# Patient Record
Sex: FEMALE | Race: WHITE | NOT HISPANIC OR LATINO | Employment: FULL TIME | URBAN - NONMETROPOLITAN AREA
[De-identification: names, ages, dates, MRNs, and addresses within clinical notes are randomized per-mention and may not be internally consistent; named-entity substitution may affect disease eponyms.]

---

## 2017-12-21 ENCOUNTER — OTHER- (OUTPATIENT)
Dept: URBAN - NONMETROPOLITAN AREA CLINIC 3 | Facility: CLINIC | Age: 37
Setting detail: DERMATOLOGY
End: 2017-12-21

## 2017-12-21 DIAGNOSIS — L57.0 ACTINIC KERATOSIS: ICD-10-CM

## 2017-12-21 PROCEDURE — 99212 OFFICE O/P EST SF 10 MIN: CPT

## 2017-12-21 RX ORDER — CLOBETASOL PROPIONATE 0.5 MG/G
1 APPLICATION GEL TOPICAL BID
Qty: 60 | Refills: 1 | Status: DISCONTINUED
Start: 2017-12-21 | End: 2018-05-08

## 2018-05-08 ENCOUNTER — RX ONLY (RX ONLY)
Age: 38
End: 2018-05-08

## 2018-05-08 RX ORDER — CLOBETASOL PROPIONATE 0.5 MG/G
1 APPLICATION GEL TOPICAL BID
Qty: 60 | Refills: 1 | Status: DISCONTINUED
Start: 2018-05-08 | End: 2018-10-05

## 2018-10-05 ENCOUNTER — RX ONLY (RX ONLY)
Age: 38
End: 2018-10-05

## 2018-10-05 RX ORDER — CLOBETASOL PROPIONATE 0.5 MG/G
1 APPLICATION GEL TOPICAL BID
Qty: 60 | Refills: 1 | Status: DISCONTINUED
Start: 2018-10-05 | End: 2019-04-08

## 2019-04-08 ENCOUNTER — RX ONLY (RX ONLY)
Age: 39
End: 2019-04-08

## 2019-04-08 RX ORDER — CLOBETASOL PROPIONATE 0.5 MG/G
1 APPLICATION GEL TOPICAL BID
Qty: 60 | Refills: 0 | Status: DISCONTINUED
Start: 2019-04-08 | End: 2019-05-13

## 2019-05-13 ENCOUNTER — SKIN CHECK (OUTPATIENT)
Dept: URBAN - NONMETROPOLITAN AREA CLINIC 3 | Facility: CLINIC | Age: 39
Setting detail: DERMATOLOGY
End: 2019-05-13

## 2019-05-13 DIAGNOSIS — L28.1 PRURIGO NODULARIS: ICD-10-CM

## 2019-05-13 PROCEDURE — 99213 OFFICE O/P EST LOW 20 MIN: CPT

## 2019-05-13 RX ORDER — FLUOCINONIDE 0.5 MG/G
1 APPLICATION OINTMENT TOPICAL BID
Qty: 60 | Refills: 1 | Status: DISCONTINUED
Start: 2019-05-13 | End: 2020-03-03

## 2019-08-06 ENCOUNTER — RX ONLY (RX ONLY)
Age: 39
End: 2019-08-06

## 2019-08-06 RX ORDER — CLOBETASOL PROPIONATE 0.5 MG/G
1 APPLICATION OINTMENT TOPICAL BID
Qty: 60 | Refills: 1 | Status: DISCONTINUED
Start: 2019-08-06 | End: 2020-03-03

## 2020-03-03 ENCOUNTER — RX ONLY (RX ONLY)
Age: 40
End: 2020-03-03

## 2020-03-03 RX ORDER — CLOBETASOL PROPIONATE 0.5 MG/G
1 APPLICATION OINTMENT TOPICAL BID
Qty: 60 | Refills: 1
Start: 2020-03-03

## 2021-01-13 ENCOUNTER — FOLLOW-UP (OUTPATIENT)
Dept: URBAN - NONMETROPOLITAN AREA CLINIC 3 | Facility: CLINIC | Age: 41
Setting detail: DERMATOLOGY
End: 2021-01-13

## 2021-01-13 DIAGNOSIS — C44.519 BASAL CELL CARCINOMA OF SKIN OF OTHER PART OF TRUNK: ICD-10-CM

## 2021-01-13 PROCEDURE — 99212 OFFICE O/P EST SF 10 MIN: CPT

## 2021-01-13 RX ORDER — CLOBETASOL PROPIONATE 0.5 MG/G
A SMALL AMOUNT OINTMENT TOPICAL TWICE A DAY
Qty: 60 | Refills: 4
Start: 2021-01-13

## 2021-07-02 ENCOUNTER — OTHER- (OUTPATIENT)
Dept: URBAN - NONMETROPOLITAN AREA CLINIC 3 | Facility: CLINIC | Age: 41
Setting detail: DERMATOLOGY
End: 2021-07-02

## 2021-07-02 DIAGNOSIS — L57.0 ACTINIC KERATOSIS: ICD-10-CM

## 2021-07-02 PROCEDURE — 99213 OFFICE O/P EST LOW 20 MIN: CPT

## 2021-12-13 ENCOUNTER — FOLLOW-UP (OUTPATIENT)
Dept: URBAN - NONMETROPOLITAN AREA CLINIC 3 | Facility: CLINIC | Age: 41
Setting detail: DERMATOLOGY
End: 2021-12-13

## 2021-12-13 DIAGNOSIS — L57.0 ACTINIC KERATOSIS: ICD-10-CM

## 2021-12-13 PROCEDURE — 87220 TISSUE EXAM FOR FUNGI: CPT

## 2021-12-13 PROCEDURE — 99213 OFFICE O/P EST LOW 20 MIN: CPT

## 2022-05-10 ENCOUNTER — RX ONLY (RX ONLY)
Age: 42
End: 2022-05-10

## 2022-05-10 RX ORDER — CLOBETASOL PROPIONATE 0.5 MG/G
A SMALL AMOUNT OINTMENT TOPICAL TWICE A DAY
Qty: 60 | Refills: 1
Start: 2022-05-10

## 2023-01-11 ENCOUNTER — RX ONLY (RX ONLY)
Age: 43
End: 2023-01-11

## 2023-01-11 RX ORDER — CLOBETASOL PROPIONATE 0.5 MG/G
OINTMENT TOPICAL
Qty: 30 | Refills: 0 | Status: ERX | COMMUNITY
Start: 2023-01-11

## 2023-01-18 ENCOUNTER — APPOINTMENT (OUTPATIENT)
Dept: URBAN - NONMETROPOLITAN AREA CLINIC 49 | Age: 43
Setting detail: DERMATOLOGY
End: 2023-01-19

## 2023-01-18 DIAGNOSIS — L30.1 DYSHIDROSIS [POMPHOLYX]: ICD-10-CM

## 2023-01-18 PROCEDURE — OTHER ORDER TESTS: OTHER

## 2023-01-18 PROCEDURE — OTHER PRESCRIPTION: OTHER

## 2023-01-18 PROCEDURE — OTHER COUNSELING: OTHER

## 2023-01-18 PROCEDURE — 99214 OFFICE O/P EST MOD 30 MIN: CPT

## 2023-01-18 RX ORDER — CLOBETASOL PROPIONATE 0.5 MG/G
OINTMENT TOPICAL
Qty: 60 | Refills: 2 | Status: ERX

## 2023-01-18 ASSESSMENT — LOCATION ZONE DERM
LOCATION ZONE: FEET
LOCATION ZONE: HAND

## 2023-01-18 ASSESSMENT — LOCATION SIMPLE DESCRIPTION DERM
LOCATION SIMPLE: LEFT HAND
LOCATION SIMPLE: RIGHT PLANTAR SURFACE
LOCATION SIMPLE: LEFT PLANTAR SURFACE
LOCATION SIMPLE: RIGHT HAND

## 2023-01-18 ASSESSMENT — LOCATION DETAILED DESCRIPTION DERM
LOCATION DETAILED: RIGHT MEDIAL PLANTAR MIDFOOT
LOCATION DETAILED: RIGHT ULNAR PALM
LOCATION DETAILED: LEFT MEDIAL PLANTAR MIDFOOT
LOCATION DETAILED: LEFT THENAR EMINENCE

## 2023-01-18 NOTE — PROCEDURE: COUNSELING
Patient Specific Counseling (Will Not Stick From Patient To Patient): As the patient is not satisfied with her level of control discussed other treatment options to include use of a calcineurin inhibitor or methotrexate, pt wishes to try methotrexate, discussed potential risks and benefits, will get baseline labs and if WNL will start titration.  Pt will continue using Clobetasol for the short term, hopefully will be able to stop using it for the most part if MTX therapy is successful.
Detail Level: Simple

## 2023-01-18 NOTE — HPI: RASH (ECZEMA)
Is This A New Presentation, Or A Follow-Up?: Follow Up Eczema
Additional History: Moisturizes several times a day.

## 2023-01-20 ENCOUNTER — RX ONLY (RX ONLY)
Age: 43
End: 2023-01-20

## 2023-01-20 RX ORDER — METHOTREXATE SODIUM 2.5 MG/1
TABLET ORAL
Qty: 1 | Refills: 0 | Status: ERX | COMMUNITY
Start: 2023-01-20

## 2023-01-26 ENCOUNTER — RX ONLY (RX ONLY)
Age: 43
End: 2023-01-26

## 2023-01-26 RX ORDER — METHOTREXATE SODIUM 2.5 MG/1
TABLET ORAL
Qty: 16 | Refills: 1 | Status: ERX

## 2023-01-26 RX ORDER — METHOTREXATE SODIUM 2.5 MG/1
TABLET ORAL
Qty: 13 | Refills: 0 | Status: ERX

## 2023-01-26 RX ORDER — FOLIC ACID 1 MG/1
TABLET ORAL
Qty: 20 | Refills: 5 | Status: ERX | COMMUNITY
Start: 2023-01-26

## 2023-03-09 ENCOUNTER — RX ONLY (RX ONLY)
Age: 43
End: 2023-03-09

## 2023-03-09 RX ORDER — METHOTREXATE SODIUM 2.5 MG/1
TABLET ORAL
Qty: 20 | Refills: 0 | Status: ERX

## 2023-04-12 ENCOUNTER — APPOINTMENT (OUTPATIENT)
Dept: URBAN - NONMETROPOLITAN AREA CLINIC 49 | Age: 43
Setting detail: DERMATOLOGY
End: 2023-04-12

## 2023-04-12 DIAGNOSIS — L30.1 DYSHIDROSIS [POMPHOLYX]: ICD-10-CM

## 2023-04-12 PROCEDURE — OTHER COUNSELING: OTHER

## 2023-04-12 PROCEDURE — OTHER PRESCRIPTION: OTHER

## 2023-04-12 PROCEDURE — OTHER ORDER TESTS: OTHER

## 2023-04-12 PROCEDURE — 99214 OFFICE O/P EST MOD 30 MIN: CPT

## 2023-04-12 RX ORDER — METHOTREXATE SODIUM 2.5 MG/1
TABLET ORAL
Qty: 24 | Refills: 5 | Status: ERX

## 2023-04-12 RX ORDER — FOLIC ACID 1 MG/1
TABLET ORAL
Qty: 20 | Refills: 5 | Status: ERX

## 2023-04-12 NOTE — HPI: RASH (ECZEMA)
Is This A New Presentation, Or A Follow-Up?: Follow Up Eczema
Additional History: States she is consistent with medications and moisturizes frequently.

## 2023-04-12 NOTE — PROCEDURE: COUNSELING
Patient Specific Counseling (Will Not Stick From Patient To Patient): Will increase to MTX 6 pills once weekly, continue folic acid, if not able to achieve a reasonable level of control by f/u may consider referral to [facility] or [facility] for an assessment. Patient Specific Counseling (Will Not Stick From Patient To Patient): Will increase to MTX 6 pills once weekly, continue folic acid, if not able to achieve a reasonable level of control by f/u may consider referral to On license of [facility] or [facility] for an assessment.

## 2023-10-23 ENCOUNTER — APPOINTMENT (OUTPATIENT)
Dept: URBAN - NONMETROPOLITAN AREA CLINIC 49 | Age: 43
Setting detail: DERMATOLOGY
End: 2023-10-23

## 2023-10-23 DIAGNOSIS — L30.1 DYSHIDROSIS [POMPHOLYX]: ICD-10-CM

## 2023-10-23 PROCEDURE — 99213 OFFICE O/P EST LOW 20 MIN: CPT

## 2023-10-23 PROCEDURE — OTHER ORDER TESTS: OTHER

## 2023-10-23 PROCEDURE — OTHER COUNSELING: OTHER

## 2023-10-23 ASSESSMENT — LOCATION SIMPLE DESCRIPTION DERM
LOCATION SIMPLE: RIGHT HAND
LOCATION SIMPLE: LEFT HAND

## 2023-10-23 ASSESSMENT — LOCATION DETAILED DESCRIPTION DERM
LOCATION DETAILED: RIGHT RADIAL DORSAL HAND
LOCATION DETAILED: LEFT ULNAR DORSAL HAND

## 2023-10-23 ASSESSMENT — LOCATION ZONE DERM: LOCATION ZONE: HAND

## 2023-10-23 NOTE — HPI: RASH (ECZEMA)
Is This A New Presentation, Or A Follow-Up?: Follow Up Eczema
Additional History: Taking 6 tablets once weekly.

## 2023-10-23 NOTE — PROCEDURE: COUNSELING
Patient Specific Counseling (Will Not Stick From Patient To Patient): Will continue with MTX 6 pills once weekly and folic acid once labs return WNL, pt may try to drop to 5 pills once weekly at the beginning of the summer.
Detail Level: Zone

## 2023-10-24 RX ORDER — METHOTREXATE SODIUM 2.5 MG/1
TABLET ORAL
Qty: 24 | Refills: 5 | Status: ERX

## 2023-10-24 RX ORDER — FOLIC ACID 1 MG/1
TABLET ORAL
Qty: 20 | Refills: 5 | Status: ERX

## 2024-04-01 ENCOUNTER — APPOINTMENT (OUTPATIENT)
Dept: URBAN - NONMETROPOLITAN AREA CLINIC 49 | Age: 44
Setting detail: DERMATOLOGY
End: 2024-04-01

## 2024-04-01 DIAGNOSIS — L81.4 OTHER MELANIN HYPERPIGMENTATION: ICD-10-CM

## 2024-04-01 DIAGNOSIS — L30.1 DYSHIDROSIS [POMPHOLYX]: ICD-10-CM

## 2024-04-01 PROCEDURE — 99213 OFFICE O/P EST LOW 20 MIN: CPT

## 2024-04-01 PROCEDURE — OTHER COUNSELING: OTHER

## 2024-04-01 PROCEDURE — OTHER ORDER TESTS: OTHER

## 2024-04-01 ASSESSMENT — LOCATION ZONE DERM
LOCATION ZONE: FACE
LOCATION ZONE: FINGER
LOCATION ZONE: HAND
LOCATION ZONE: FEET

## 2024-04-01 ASSESSMENT — LOCATION SIMPLE DESCRIPTION DERM
LOCATION SIMPLE: RIGHT HAND
LOCATION SIMPLE: RIGHT SMALL FINGER
LOCATION SIMPLE: LEFT PLANTAR SURFACE
LOCATION SIMPLE: LEFT HAND
LOCATION SIMPLE: RIGHT FOREHEAD

## 2024-04-01 ASSESSMENT — LOCATION DETAILED DESCRIPTION DERM
LOCATION DETAILED: LEFT HYPOTHENAR EMINENCE
LOCATION DETAILED: RIGHT INFERIOR FOREHEAD
LOCATION DETAILED: RIGHT HYPOTHENAR EMINENCE
LOCATION DETAILED: RIGHT PROXIMAL RADIAL PALMAR SMALL FINGER
LOCATION DETAILED: LEFT MEDIAL PLANTAR MIDFOOT

## 2024-04-01 NOTE — HPI: ECZEMA (PATIENT REPORTED)
Where Is Your Eczema Located?: Hands, feet
Additional Comments (Use Complete Sentences): History of Dyshidrotic eczema, treating with MTX and Clobetasol . Areas were improved but flaring during colder months.

## 2024-04-01 NOTE — PROCEDURE: ORDER TESTS
Performing Laboratory: [id number]
Bill For Surgical Tray: no
Lab Facility: 0
Billing Type: Third-Party Bill
Expected Date Of Service: 04/01/2024

## 2024-04-01 NOTE — PROCEDURE: COUNSELING
Detail Level: Detailed
Patient Specific Counseling (Will Not Stick From Patient To Patient): Advised lesions should improve with the warmer weather, pt still satisfied with current regimen, will get labs drawn and if WNL will send off new rx for MTX 6 pills once weekly/Folic acid.  Stressed need for frequent and consistent moisturizing, Clobetasol as needed only for flares.
Detail Level: Simple

## 2024-04-03 RX ORDER — FOLIC ACID 1 MG/1
TABLET ORAL
Qty: 20 | Refills: 5 | Status: ERX

## 2024-04-03 RX ORDER — METHOTREXATE SODIUM 2.5 MG/1
TABLET ORAL
Qty: 24 | Refills: 5 | Status: ERX

## 2024-09-12 ENCOUNTER — RX ONLY (RX ONLY)
Age: 44
End: 2024-09-12

## 2024-09-12 RX ORDER — CLOBETASOL PROPIONATE 0.5 MG/G
OINTMENT TOPICAL
Qty: 60 | Refills: 0 | Status: ERX

## 2024-10-07 ENCOUNTER — APPOINTMENT (OUTPATIENT)
Dept: URBAN - NONMETROPOLITAN AREA CLINIC 49 | Age: 44
Setting detail: DERMATOLOGY
End: 2024-10-09

## 2024-10-07 DIAGNOSIS — L30.1 DYSHIDROSIS [POMPHOLYX]: ICD-10-CM

## 2024-10-07 PROCEDURE — 99213 OFFICE O/P EST LOW 20 MIN: CPT

## 2024-10-07 PROCEDURE — OTHER COUNSELING: OTHER

## 2024-10-07 PROCEDURE — OTHER ORDER TESTS: OTHER

## 2024-10-07 PROCEDURE — OTHER MIPS QUALITY: OTHER

## 2024-10-07 ASSESSMENT — LOCATION SIMPLE DESCRIPTION DERM
LOCATION SIMPLE: LEFT PLANTAR SURFACE
LOCATION SIMPLE: RIGHT HAND
LOCATION SIMPLE: LEFT HAND

## 2024-10-07 ASSESSMENT — LOCATION DETAILED DESCRIPTION DERM
LOCATION DETAILED: LEFT MEDIAL PLANTAR MIDFOOT
LOCATION DETAILED: RIGHT THENAR EMINENCE
LOCATION DETAILED: LEFT THENAR EMINENCE

## 2024-10-07 ASSESSMENT — LOCATION ZONE DERM
LOCATION ZONE: FEET
LOCATION ZONE: HAND

## 2024-10-07 ASSESSMENT — BSA RASH: BSA RASH: 1

## 2024-10-07 ASSESSMENT — ITCH NUMERIC RATING SCALE: HOW SEVERE IS YOUR ITCHING?: 1

## 2024-10-07 ASSESSMENT — SEVERITY ASSESSMENT 2020: SEVERITY 2020: MILD

## 2024-10-07 NOTE — PROCEDURE: ORDER TESTS
Bill For Surgical Tray: no
Lab Facility: 0
Expected Date Of Service: 10/07/2024
Billing Type: Third-Party Bill
Performing Laboratory: [id number]

## 2024-10-07 NOTE — PROCEDURE: COUNSELING
Detail Level: Simple
Patient Specific Counseling (Will Not Stick From Patient To Patient): Will continue MTX 6 pills once weekly dosing and folic acid the day after pending labs WNL.

## 2024-10-10 ENCOUNTER — RX ONLY (RX ONLY)
Age: 44
End: 2024-10-10

## 2024-10-10 RX ORDER — METHOTREXATE SODIUM 2.5 MG/1
TABLET ORAL
Qty: 24 | Refills: 5 | Status: ERX

## 2024-10-10 RX ORDER — FOLIC ACID 1 MG/1
TABLET ORAL
Qty: 20 | Refills: 5 | Status: ERX

## 2025-04-08 ENCOUNTER — APPOINTMENT (OUTPATIENT)
Dept: URBAN - NONMETROPOLITAN AREA CLINIC 49 | Age: 45
Setting detail: DERMATOLOGY
End: 2025-04-08

## 2025-04-08 DIAGNOSIS — L30.1 DYSHIDROSIS [POMPHOLYX]: ICD-10-CM

## 2025-04-08 PROCEDURE — OTHER ORDER TESTS: OTHER

## 2025-04-08 PROCEDURE — OTHER COUNSELING: OTHER

## 2025-04-08 PROCEDURE — 99213 OFFICE O/P EST LOW 20 MIN: CPT

## 2025-04-08 ASSESSMENT — LOCATION ZONE DERM
LOCATION ZONE: HAND
LOCATION ZONE: FEET

## 2025-04-08 ASSESSMENT — LOCATION DETAILED DESCRIPTION DERM
LOCATION DETAILED: LEFT THENAR EMINENCE
LOCATION DETAILED: RIGHT ARCH
LOCATION DETAILED: RIGHT THENAR EMINENCE
LOCATION DETAILED: LEFT ARCH

## 2025-04-08 ASSESSMENT — LOCATION SIMPLE DESCRIPTION DERM
LOCATION SIMPLE: RIGHT PLANTAR SURFACE
LOCATION SIMPLE: LEFT PLANTAR SURFACE
LOCATION SIMPLE: RIGHT HAND
LOCATION SIMPLE: LEFT HAND

## 2025-04-08 NOTE — HPI: RASH (ECZEMA)
Is This A New Presentation, Or A Follow-Up?: Follow Up Eczema
Additional History: Following up on Dyshidrotic eczema
129

## 2025-04-08 NOTE — PROCEDURE: ORDER TESTS
Lab Facility: 0
Performing Laboratory: [id number]
Billing Type: Third-Party Bill
Bill For Surgical Tray: no
Expected Date Of Service: 04/08/2025

## 2025-04-08 NOTE — PROCEDURE: COUNSELING
Patient Specific Counseling (Will Not Stick From Patient To Patient): Discussed continuing MTX 6 pills once weekly followed by folic acid the day after vs trial of Dupixent.  Advised this is a off label usage for Dupixent and I'm not sure how we would get it covered, might have to go through the foundation.  The pt will continue the MTX/folic acid combo for now but will consider and let me know.  Continue Clobetasol as needed.
Detail Level: Simple

## 2025-04-18 ENCOUNTER — RX ONLY (RX ONLY)
Age: 45
End: 2025-04-18

## 2025-04-18 RX ORDER — FOLIC ACID 1 MG/1
TABLET ORAL
Qty: 20 | Refills: 5 | Status: ERX

## 2025-04-18 RX ORDER — METHOTREXATE SODIUM 2.5 MG/1
TABLET ORAL
Qty: 24 | Refills: 5 | Status: ERX